# Patient Record
Sex: MALE | Race: WHITE | NOT HISPANIC OR LATINO | URBAN - METROPOLITAN AREA
[De-identification: names, ages, dates, MRNs, and addresses within clinical notes are randomized per-mention and may not be internally consistent; named-entity substitution may affect disease eponyms.]

---

## 2024-08-05 ENCOUNTER — OFFICE (OUTPATIENT)
Dept: URBAN - METROPOLITAN AREA CLINIC 11 | Facility: CLINIC | Age: 19
End: 2024-08-05
Payer: COMMERCIAL

## 2024-08-05 VITALS
HEIGHT: 70 IN | WEIGHT: 138 LBS | SYSTOLIC BLOOD PRESSURE: 119 MMHG | DIASTOLIC BLOOD PRESSURE: 78 MMHG | BODY MASS INDEX: 19.76 KG/M2 | HEART RATE: 78 BPM | OXYGEN SATURATION: 99 %

## 2024-08-05 DIAGNOSIS — I85.00 ESOPHAGEAL VARICES WITHOUT BLEEDING: ICD-10-CM

## 2024-08-05 PROCEDURE — 99203 OFFICE O/P NEW LOW 30 MIN: CPT | Performed by: INTERNAL MEDICINE

## 2024-08-05 NOTE — SERVICEHPINOTES
Yuriy Land   is a   18   year old  male   here today  Due to known history of portal hypertension and esophageal varices.  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  He has been known to have non cirrhotic portal hypertension and has had endoscopies with variceal banding in the past.  He had been undergoing endoscopies about every 6 months and has not required any additional banding in the last 2 or 3 years.  He denies any ascites.  He denies any fluid retention.  He denies any encephalopathy.